# Patient Record
(demographics unavailable — no encounter records)

---

## 2024-12-05 NOTE — HISTORY OF PRESENT ILLNESS
[FreeTextEntry1] : 78-year-old male here to establish care.  History of hypertension and hyperlipidemia.  Has a long history of dyspeptic symptoms going back to 1999.  Has had several upper endoscopies and colonoscopies.  History of colon polyps.  Last EGD 2019 along with colonoscopy.  EGD reportedly unremarkable.  Colonoscopy showed polyps prompting a 3-year follow-up colonoscopy in 2022 that was unremarkable.  5-year recall was given.  Had a CT abdomen and pelvis in August 2023 that was unremarkable.  Symptoms consist primarily of intermittent dysphagia to solids with upper abdominal pain radiating to the back.  Patient needs to drink a large amount of water which will eventually allow the food to pass but causes severe abdominal "gas pains".  Does not recall any pertinent findings on upper endoscopies.  Omeprazole was discontinued about 10 years ago and has been on famotidine 40 mg every morning since.

## 2024-12-05 NOTE — ASSESSMENT
[FreeTextEntry1] : Impression: Dyspepsia/dysphagia most likely esophageal spasm/dysmotility.  Rule out EOE, web, stricture, large hiatal hernia.  Plan: Patient currently takes famotidine every morning.  Will change to nightly and begin pantoprazole 40 mg in the morning.  Schedule EGD with biopsy.  Consider neuromodulator pending results and response.  Repeat surveillance colonoscopy 2027